# Patient Record
Sex: FEMALE | Race: WHITE | NOT HISPANIC OR LATINO | Employment: OTHER | URBAN - METROPOLITAN AREA
[De-identification: names, ages, dates, MRNs, and addresses within clinical notes are randomized per-mention and may not be internally consistent; named-entity substitution may affect disease eponyms.]

---

## 2024-08-06 ENCOUNTER — OFFICE VISIT (OUTPATIENT)
Dept: OTOLARYNGOLOGY | Facility: CLINIC | Age: 64
End: 2024-08-06
Payer: MEDICARE

## 2024-08-06 VITALS — HEIGHT: 61 IN | TEMPERATURE: 98 F | BODY MASS INDEX: 34.7 KG/M2 | WEIGHT: 183.8 LBS

## 2024-08-06 DIAGNOSIS — H60.392 OTHER INFECTIVE ACUTE OTITIS EXTERNA OF LEFT EAR: ICD-10-CM

## 2024-08-06 DIAGNOSIS — H92.02 LEFT EAR PAIN: Primary | ICD-10-CM

## 2024-08-06 DIAGNOSIS — H61.23 BILATERAL IMPACTED CERUMEN: ICD-10-CM

## 2024-08-06 PROCEDURE — 3008F BODY MASS INDEX DOCD: CPT | Performed by: NURSE PRACTITIONER

## 2024-08-06 PROCEDURE — 99204 OFFICE O/P NEW MOD 45 MIN: CPT | Performed by: NURSE PRACTITIONER

## 2024-08-06 RX ORDER — LAMOTRIGINE 100 MG/1
100 TABLET ORAL
COMMUNITY
Start: 2024-02-01

## 2024-08-06 RX ORDER — LEVOTHYROXINE SODIUM 125 UG/1
1 TABLET ORAL
COMMUNITY
Start: 2017-11-30 | End: 2025-02-26

## 2024-08-06 RX ORDER — ERGOCALCIFEROL 1.25 MG/1
1 CAPSULE ORAL
COMMUNITY
Start: 2024-02-27 | End: 2025-02-28

## 2024-08-06 RX ORDER — EVOLOCUMAB 140 MG/ML
140 INJECTION, SOLUTION SUBCUTANEOUS
COMMUNITY
Start: 2024-02-02

## 2024-08-06 RX ORDER — GABAPENTIN 100 MG/1
CAPSULE ORAL
COMMUNITY
Start: 2024-02-01 | End: 2024-11-29

## 2024-08-06 RX ORDER — NEOMYCIN SULFATE, POLYMYXIN B SULFATE, HYDROCORTISONE 3.5; 10000; 1 MG/ML; [USP'U]/ML; MG/ML
3 SOLUTION/ DROPS AURICULAR (OTIC) 2 TIMES DAILY
Qty: 10 ML | Refills: 0 | Status: SHIPPED | OUTPATIENT
Start: 2024-08-06 | End: 2024-08-13

## 2024-08-06 RX ORDER — CITALOPRAM 10 MG/1
10 TABLET ORAL
COMMUNITY
Start: 2022-10-11

## 2024-08-06 RX ORDER — MAGNESIUM 200 MG
TABLET ORAL
COMMUNITY
Start: 2024-02-27

## 2024-08-06 ASSESSMENT — PATIENT HEALTH QUESTIONNAIRE - PHQ9
1. LITTLE INTEREST OR PLEASURE IN DOING THINGS: NOT AT ALL
2. FEELING DOWN, DEPRESSED OR HOPELESS: NOT AT ALL
SUM OF ALL RESPONSES TO PHQ9 QUESTIONS 1 AND 2: 0

## 2024-08-06 NOTE — PROGRESS NOTES
Subjective   Patient ID: Shruthi Merrill is a 63 y.o. female who presents for left ear pain (With drainage ).  HPI  The patient is referred for evaluation of a left sided ear infection.  When asked about ear pain, hearing loss, discharge from ear, tinnitus, aural fullness or autophony in the affected ear or ears, the patient admits to left-sided otalgia, autophony, fullness and crusting drainage for the past 2 days.  She also reports left-sided throat pain.  When asked about a significant past otological history including history of prior ear surgery, noise exposure, exposure to ototoxic drugs or agents, and/or family history of hearing loss, the patient admits to none.    Review of Systems  A comprehensive or 10 points review of the patient´s constitutional, neurological, HEENT, pulmonary, cardiovascular and genito-urinary systems showed only those mentioned in history of present illness.    Objective   Physical Exam  Constitutional: no fever, chills, weight loss or weight gain   General appearance: Appears well, well-nourished, well groomed. No acute distress.   Communication: Normal communication   Psychiatric: Oriented to person, place and time. Normal mood and affect.   Neurologic: Cranial nerves II-XII grossly intact and symmetric bilaterally.   Head and Face:   Head: Atraumatic with no masses, lesions or scarring.   Face: Normal symmetry, no paralysis, synkinesis or facial tic. No scars or deformities.     Eyes: Conjunctiva not edematous or erythematous   Ears: External inspection of ears with no deformity, scars or masses.  Bilateral canals with cerumen impactions.  Tenderness with tragal pressure.     Neck: Normal appearing, symmetric, trachea midline.   Cardiovascular: Examination of peripheral vascular system shows no clubbing or cyanosis.   Respiratory: No respiratory distress increased work of breathing. Inspection of the chest with symmetric chest expansion and normal respiratory effort.    Skin: No rashes in the head or neck    Assessment/Plan     This patient presents for initial evaluation of acute acquired bilateral cerumen impaction, left-sided otalgia and left otitis externa.    I recommended a 7-day course of antibiotic/steroid drops.  If symptoms have not completely resolved, asked that she contact my office.  Otherwise, she may follow-up as needed.  All questions were answered to patient's satisfaction.    This note was created using speech recognition transcription software. Despite proofreading, several typographical errors might be present that might affect the meaning of the content. Please call with any questions.  Patient ID: Shruthi Merrill is a 63 y.o. female.    Ear cerumen removal    Date/Time: 8/6/2024 11:17 AM    Performed by: JERRY Araiza  Authorized by: JERRY Araiza    Consent:     Consent obtained:  Verbal    Consent given by:  Patient    Risks discussed:  Pain    Alternatives discussed:  No treatment  Procedure details:     Location:  L ear and R ear    Procedure type comment:  Suction and alligator    Procedure outcomes: cerumen removed    Post-procedure details:     Inspection:  No bleeding, ear canal clear and TM intact    Hearing quality:  Improved    Procedure completion:  Tolerated well, no immediate complications  Comments:      On the right, large amount of dried brown cerumen removed.  TM intact.  EAC skin is normal.  On the left, large amount of clear otorrhea, cerumen, wet skin debris removed with suction.  Underlying EAC skin is slightly erythematous.  TM intact.  No effusion or retraction noted.  Berger is midline after cleaning.         JERRY Araiza 08/06/24 11:13 AM

## 2024-08-07 ENCOUNTER — LAB (OUTPATIENT)
Dept: LAB | Facility: LAB | Age: 64
End: 2024-08-07
Payer: MEDICARE

## 2024-08-07 ENCOUNTER — OFFICE VISIT (OUTPATIENT)
Dept: OTOLARYNGOLOGY | Facility: CLINIC | Age: 64
End: 2024-08-07
Payer: MEDICARE

## 2024-08-07 VITALS — BODY MASS INDEX: 34.55 KG/M2 | HEIGHT: 61 IN | WEIGHT: 183 LBS

## 2024-08-07 DIAGNOSIS — H92.02 LEFT EAR PAIN: ICD-10-CM

## 2024-08-07 DIAGNOSIS — H60.392 OTHER INFECTIVE ACUTE OTITIS EXTERNA OF LEFT EAR: ICD-10-CM

## 2024-08-07 DIAGNOSIS — H60.392 OTHER INFECTIVE ACUTE OTITIS EXTERNA OF LEFT EAR: Primary | ICD-10-CM

## 2024-08-07 LAB
CRP SERPL-MCNC: 1.12 MG/DL
ERYTHROCYTE [DISTWIDTH] IN BLOOD BY AUTOMATED COUNT: 12 % (ref 11.5–14.5)
ERYTHROCYTE [SEDIMENTATION RATE] IN BLOOD BY WESTERGREN METHOD: 21 MM/H (ref 0–30)
HCT VFR BLD AUTO: 40.4 % (ref 36–46)
HGB BLD-MCNC: 13.1 G/DL (ref 12–16)
MCH RBC QN AUTO: 31.6 PG (ref 26–34)
MCHC RBC AUTO-ENTMCNC: 32.4 G/DL (ref 32–36)
MCV RBC AUTO: 97 FL (ref 80–100)
NRBC BLD-RTO: 0 /100 WBCS (ref 0–0)
PLATELET # BLD AUTO: 275 X10*3/UL (ref 150–450)
RBC # BLD AUTO: 4.15 X10*6/UL (ref 4–5.2)
WBC # BLD AUTO: 9.2 X10*3/UL (ref 4.4–11.3)

## 2024-08-07 PROCEDURE — 3008F BODY MASS INDEX DOCD: CPT | Performed by: NURSE PRACTITIONER

## 2024-08-07 PROCEDURE — 99214 OFFICE O/P EST MOD 30 MIN: CPT | Performed by: NURSE PRACTITIONER

## 2024-08-07 PROCEDURE — 85652 RBC SED RATE AUTOMATED: CPT

## 2024-08-07 PROCEDURE — 85027 COMPLETE CBC AUTOMATED: CPT

## 2024-08-07 PROCEDURE — 86140 C-REACTIVE PROTEIN: CPT

## 2024-08-07 PROCEDURE — 36415 COLL VENOUS BLD VENIPUNCTURE: CPT

## 2024-08-07 RX ORDER — IBUPROFEN 800 MG/1
800 TABLET ORAL
COMMUNITY

## 2024-08-07 RX ORDER — CEPHALEXIN 500 MG/1
500 CAPSULE ORAL 2 TIMES DAILY
Qty: 28 CAPSULE | Refills: 0 | Status: SHIPPED | OUTPATIENT
Start: 2024-08-07 | End: 2024-08-21

## 2024-08-07 ASSESSMENT — PATIENT HEALTH QUESTIONNAIRE - PHQ9
1. LITTLE INTEREST OR PLEASURE IN DOING THINGS: NOT AT ALL
SUM OF ALL RESPONSES TO PHQ9 QUESTIONS 1 AND 2: 0
2. FEELING DOWN, DEPRESSED OR HOPELESS: NOT AT ALL

## 2024-08-07 NOTE — PROGRESS NOTES
Subjective   Patient ID: Shruthi Merrill is a 63 y.o. female who presents for Follow-up (Fluid in ear and pain in and around ear had blood on cotton ball after removing it.).  HPI  This patient presents for an urgent visit.  In review, she saw me yesterday for left-sided otalgia, cerumen impaction, otitis externa.  She started using Cortisporin drops last night.  She woke this morning with significant worsening of left-sided pain as well as some bloody otorrhea.  She has taken extra strength Tylenol and 800 mg of ibuprofen with minimal relief of the pain.  Review of Systems  A comprehensive or 10 points review of the patient´s constitutional, neurological, HEENT, pulmonary, cardiovascular and genito-urinary systems showed only those mentioned in history of present illness.    Objective   Physical Exam  Constitutional: no fever, chills, weight loss or weight gain   General appearance: Appears well, well-nourished, well groomed. No acute distress.   Communication: Normal communication   Psychiatric: Oriented to person, place and time. Normal mood and affect.   Neurologic: Cranial nerves II-XII grossly intact and symmetric bilaterally.   Head and Face:   Head: Atraumatic with no masses, lesions or scarring.   Face: Normal symmetry, no paralysis, synkinesis or facial tic. No scars or deformities.   TMJ: No left-sided tenderness or crepitus  Eyes: Conjunctiva not edematous or erythematous   Ears: External inspection of ears with no deformity, scars or masses.  Right canal clear.  TM intact.  No effusion or retraction noted.  Left canal now with swelling and slight erythema to the inferior lateral aspect.  No abscess or lesion noted.  Patient is exquisitely tender with exam.  Remaining canal with minimal cerumen debris.  TM intact.  No effusion or retraction noted, but slightly limited visual exam due to swelling of the canal.     Neck: Normal appearing, symmetric, trachea midline.   Cardiovascular:  Examination of peripheral vascular system shows no clubbing or cyanosis.   Respiratory: No respiratory distress increased work of breathing. Inspection of the chest with symmetric chest expansion and normal respiratory effort.   Skin: No rashes in the head or neck    Assessment/Plan     This patient presents for subsequent evaluation of acute acquired left-sided otalgia and left otitis externa.    I reviewed her case/exam with Dr. Valle over the phone.  He recommends adding bacitracin topical twice daily as well as Keflex for possible abscess of the ear canal.  He also recommended checking inflammatory markers and a CBC.  Her symptoms should significantly improve in the next 24 to 48 hours.  I will contact her tomorrow with her lab results.  If pain becomes worse or she develops any erythema behind her left ear, she was advised to go to the main campus ED.  Patient is in agreement with the plan.  All questions were answered to patient's satisfaction.    This note was created using speech recognition transcription software. Despite proofreading, several typographical errors might be present that might affect the meaning of the content. Please call with any questions.         JERRY Araiza 08/07/24 3:24 PM

## 2024-08-13 DIAGNOSIS — H92.02 LEFT EAR PAIN: Primary | ICD-10-CM

## 2024-08-14 ENCOUNTER — HOSPITAL ENCOUNTER (OUTPATIENT)
Dept: RADIOLOGY | Facility: HOSPITAL | Age: 64
Discharge: HOME | End: 2024-08-14
Payer: MEDICARE

## 2024-08-14 DIAGNOSIS — H92.02 LEFT EAR PAIN: ICD-10-CM

## 2024-08-14 PROCEDURE — 70480 CT ORBIT/EAR/FOSSA W/O DYE: CPT

## 2024-08-16 ENCOUNTER — OFFICE VISIT (OUTPATIENT)
Dept: OTOLARYNGOLOGY | Facility: CLINIC | Age: 64
End: 2024-08-16
Payer: MEDICARE

## 2024-08-16 VITALS — WEIGHT: 185 LBS | BODY MASS INDEX: 34.93 KG/M2 | HEIGHT: 61 IN

## 2024-08-16 DIAGNOSIS — H92.02 LEFT EAR PAIN: ICD-10-CM

## 2024-08-16 DIAGNOSIS — H61.892 POLYP OF LEFT EAR CANAL: ICD-10-CM

## 2024-08-16 DIAGNOSIS — H60.392 OTHER INFECTIVE ACUTE OTITIS EXTERNA OF LEFT EAR: ICD-10-CM

## 2024-08-16 DIAGNOSIS — H60.02 ABSCESS OF LEFT EXTERNAL EAR: Primary | ICD-10-CM

## 2024-08-16 PROCEDURE — 87075 CULTR BACTERIA EXCEPT BLOOD: CPT

## 2024-08-16 PROCEDURE — 87102 FUNGUS ISOLATION CULTURE: CPT

## 2024-08-16 PROCEDURE — 87205 SMEAR GRAM STAIN: CPT

## 2024-08-16 PROCEDURE — 3008F BODY MASS INDEX DOCD: CPT | Performed by: OTOLARYNGOLOGY

## 2024-08-16 PROCEDURE — 87077 CULTURE AEROBIC IDENTIFY: CPT

## 2024-08-16 PROCEDURE — 87070 CULTURE OTHR SPECIMN AEROBIC: CPT

## 2024-08-16 PROCEDURE — 99213 OFFICE O/P EST LOW 20 MIN: CPT | Performed by: OTOLARYNGOLOGY

## 2024-08-16 PROCEDURE — 87186 SC STD MICRODIL/AGAR DIL: CPT

## 2024-08-16 PROCEDURE — 69000 DRG XTRNL EAR ABSC/HEM SMPL: CPT | Performed by: OTOLARYNGOLOGY

## 2024-08-16 RX ORDER — CIPROFLOXACIN AND DEXAMETHASONE 3; 1 MG/ML; MG/ML
4 SUSPENSION/ DROPS AURICULAR (OTIC) 2 TIMES DAILY
Qty: 7.5 ML | Refills: 1 | Status: SHIPPED | OUTPATIENT
Start: 2024-08-16 | End: 2024-08-23

## 2024-08-16 ASSESSMENT — PATIENT HEALTH QUESTIONNAIRE - PHQ9
2. FEELING DOWN, DEPRESSED OR HOPELESS: NOT AT ALL
1. LITTLE INTEREST OR PLEASURE IN DOING THINGS: NOT AT ALL
SUM OF ALL RESPONSES TO PHQ9 QUESTIONS 1 AND 2: 0

## 2024-08-16 NOTE — PROGRESS NOTES
Subjective   Patient ID: Shruthi Merrill is a 63 y.o. female who presents for Follow-up (Review CT ).  HPI    This patient presented for an urgent visit last week with a NP.  She was seen last week for left-sided otalgia, cerumen impaction, otitis externa.  She started using Cortisporin drops last night.  She woke that morning with significant worsening of left-sided pain as well as some bloody otorrhea.  She has taken extra strength Tylenol and 800 mg of ibuprofen with minimal relief of the pain.      Objective   Physical Exam  Constitutional: no fever, chills, weight loss or weight gain   General appearance: Appears well, well-nourished, well groomed. No acute distress.   Communication: Normal communication   Psychiatric: Oriented to person, place and time. Normal mood and affect.   Neurologic: Cranial nerves II-XII grossly intact and symmetric bilaterally.   Head and Face:   Head: Atraumatic with no masses, lesions or scarring.   Face: Normal symmetry, no paralysis, synkinesis or facial tic. No scars or deformities.   TMJ: No left-sided tenderness or crepitus  Eyes: Conjunctiva not edematous or erythematous   Ears: External inspection of ears with no deformity, scars or masses.  Right canal clear.  TM intact.  No effusion or retraction noted.  Left canal now with swelling and slight erythema to the inferior lateral aspect with occluding aural polyp that seems to be overlying an ear canal abscess.  There was purulent and keratinaceous debris medial to the polyp.  Cultures were obtained and sent for bacterial and mycotic.  Difficult to fully assess the integrity of the tympanic membrane at this time.  Moderate discomfort during examination and cleaning.  Procedure note: Using suctions and the otomicroscope, the polyp was manipulated and the abscess was drained some purulence was expressed from that area.  Minimal bleeding occurred.  Incision and drainage of L ear abscess was completed followed by  placement of a otowick to allow instillation of the topical antibiotics..      Neck: Normal appearing, symmetric, trachea midline.   Cardiovascular: Examination of peripheral vascular system shows no clubbing or cyanosis.   Respiratory: No respiratory distress increased work of breathing. Inspection of the chest with symmetric chest expansion and normal respiratory effort.   Skin: No rashes in the head or neck    High-resolution temporal bone CT scan recently obtained reviewed showed no erosive findings of the external auditory canal on the left side.  There is thickening of the soft tissue predominantly along the cartilaginous portion of the ear canal.  There does not appear to be any swelling or any increase in the retrocondylar space on that side to suggest necrotizing otitis externa or skull base osteomyelitis.  No opacification of the middle ear or mastoid air cells.    Impression:   Left ear abscess  Left acute otitis externa    Recommendations  Will switch to Ciprodex as the patient informed me that her allergic reaction was mostly dizziness this may be a reaction.  Continue cephalexin and follow-up with her nurse practitioner next week for wick removal and reexamination.    This note was created using speech recognition transcription software. Despite proofreading, several typographical errors might be present that might affect the meaning of the content. Please call with any questions.

## 2024-08-18 LAB
BACTERIA SPEC CULT: ABNORMAL
GRAM STN SPEC: ABNORMAL
GRAM STN SPEC: ABNORMAL

## 2024-08-19 LAB
BACTERIA SPEC CULT: ABNORMAL
BACTERIA SPEC CULT: ABNORMAL
GRAM STN SPEC: ABNORMAL
GRAM STN SPEC: ABNORMAL
LABORATORY COMMENT REPORT: ABNORMAL

## 2024-08-20 LAB
FUNGUS SPEC CULT: NORMAL
FUNGUS SPEC FUNGUS STN: NORMAL

## 2024-08-21 ENCOUNTER — APPOINTMENT (OUTPATIENT)
Dept: OTOLARYNGOLOGY | Facility: CLINIC | Age: 64
End: 2024-08-21
Payer: MEDICARE

## 2024-08-26 LAB
FUNGUS SPEC CULT: NORMAL
FUNGUS SPEC FUNGUS STN: NORMAL

## 2024-08-27 ENCOUNTER — APPOINTMENT (OUTPATIENT)
Dept: OTOLARYNGOLOGY | Facility: CLINIC | Age: 64
End: 2024-08-27
Payer: MEDICARE

## 2024-08-27 VITALS — BODY MASS INDEX: 34.97 KG/M2 | WEIGHT: 185.1 LBS | TEMPERATURE: 97.7 F

## 2024-08-27 DIAGNOSIS — H92.12 OTORRHEA OF LEFT EAR: ICD-10-CM

## 2024-08-27 DIAGNOSIS — H92.02 LEFT EAR PAIN: ICD-10-CM

## 2024-08-27 DIAGNOSIS — H60.02 ABSCESS OF LEFT EXTERNAL EAR: Primary | ICD-10-CM

## 2024-08-27 PROCEDURE — 99213 OFFICE O/P EST LOW 20 MIN: CPT | Performed by: NURSE PRACTITIONER

## 2024-08-27 NOTE — PROGRESS NOTES
Subjective   Patient ID: Shruthi Merrill is a 63 y.o. female who presents for Follow-up.  HPI  This patient presents for a follow-up visit.  In review, she initially saw me for cerumen removal followed by left-sided otalgia.  On initial exam, she had some clear otorrhea and slightly erythematous inferior lateral aspect of the left EAC.  I recommended Ciprodex drops.  Unfortunately, pain worsened.  CTAC showed a left ear canal abscess which was drained by Dr. Valle on 8/16/2024.  Culture positive for Pseudomonas which is susceptible to Cipro.  A wick was also placed during that visit.  Patient has been on Keflex and Ciprodex drops.  Today, she reports some yellow/green otorrhea, left aural fullness, left hearing difficulty, and occasional sharp pains in the left ear canal.  Pain is overall improved.      Review of Systems  A comprehensive or 10 points review of the patient's constitutional, neurological, HEENT, pulmonary, cardiovascular and genito-urinary systems showed only those mentioned in history of present illness.    Objective   Physical Exam  Constitutional: no fever, chills, weight loss or weight gain   General appearance: Appears well, well-nourished, well groomed. No acute distress.   Communication: Normal communication   Psychiatric: Oriented to person, place and time. Normal mood and affect.   Neurologic: Cranial nerves II-XII grossly intact and symmetric bilaterally.   Head and Face:   Head: Atraumatic with no masses, lesions or scarring.   Face: Normal symmetry, no paralysis, synkinesis or facial tic. No scars or deformities.     Eyes: Conjunctiva not edematous or erythematous   Ears: External inspection of ears with no deformity, scars or masses.  Right canal clear.  TM intact.  No effusion or retraction noted.  Left canal occluded with partially extruded wick.     Neck: Normal appearing, symmetric, trachea midline.   Cardiovascular: Examination of peripheral vascular system shows no  clubbing or cyanosis.   Respiratory: No respiratory distress increased work of breathing. Inspection of the chest with symmetric chest expansion and normal respiratory effort.   Skin: No rashes in the head or neck    Assessment/Plan          This patient presents for subsequent evaluation of acute acquired left ear canal abscess, left-sided otalgia, left otorrhea.    We discussed that there is still significant inflammatory tissue and some drainage from the abscess.  I recommended she continue using Ciprodex drops and follow-up with myself or Dr. Valle in 1 week.  Plan was reviewed with Dr. Valle. Patient is in agreement with the plan.  All questions were answered to patient's satisfaction.    This note was created using speech recognition transcription software. Despite proofreading, several typographical errors might be present that might affect the meaning of the content. Please call with any questions.  Patient ID: Shruthi Merrill is a 63 y.o. female.    Foreign body removal    Date/Time: 8/27/2024 10:42 AM    Performed by: JERRY Araiza  Authorized by: JERRY Araiza    Consent:     Consent obtained:  Verbal    Consent given by:  Patient    Risks discussed:  Pain  Comments:      Using the microscope and alligator, wick removed from left ear canal without difficulty.  Moderate yellow debris removed with a #5 suction.  TM intact.  No effusion or retraction noted.  There is still a large area of inflamed/polypoid tissue today left inferior/posterior lateral aspect of the EAC.  Using a small swab, moderate amount of thick purulent debris expressed followed by small amount of bleeding.    After wick removal, patient reports that fullness and hearing difficulties have resolved.        JERRY Araiza 08/27/24 10:37 AM

## 2024-09-03 LAB
FUNGUS SPEC CULT: NORMAL
FUNGUS SPEC FUNGUS STN: NORMAL

## 2024-09-05 ENCOUNTER — APPOINTMENT (OUTPATIENT)
Dept: OTOLARYNGOLOGY | Facility: CLINIC | Age: 64
End: 2024-09-05
Payer: MEDICARE

## 2024-09-05 VITALS — TEMPERATURE: 97.8 F | HEIGHT: 61 IN | BODY MASS INDEX: 35.36 KG/M2 | WEIGHT: 187.3 LBS

## 2024-09-05 DIAGNOSIS — H60.02 ABSCESS OF LEFT EXTERNAL EAR: Primary | ICD-10-CM

## 2024-09-05 PROCEDURE — 3008F BODY MASS INDEX DOCD: CPT | Performed by: NURSE PRACTITIONER

## 2024-09-05 PROCEDURE — 99213 OFFICE O/P EST LOW 20 MIN: CPT | Performed by: NURSE PRACTITIONER

## 2024-09-05 ASSESSMENT — PATIENT HEALTH QUESTIONNAIRE - PHQ9
SUM OF ALL RESPONSES TO PHQ9 QUESTIONS 1 AND 2: 0
2. FEELING DOWN, DEPRESSED OR HOPELESS: NOT AT ALL
1. LITTLE INTEREST OR PLEASURE IN DOING THINGS: NOT AT ALL

## 2024-09-05 NOTE — PROGRESS NOTES
Subjective   Patient ID: Shruthi Merrill is a 63 y.o. female who presents for Follow-up (Left ear ).  HPI  This patient presents for weekly surveillance of left ear canal abscess.  Patient was just discharged from the hospital this morning after having a pacemaker inserted.  Today, she denies any otalgia for several days and no otorrhea x 2 days.  She we will be using Ciprodex drops for 2 more days.  She is following up with Dr. Valle next week.  Review of Systems  A comprehensive or 10 points review of the patient's constitutional, neurological, HEENT, pulmonary, cardiovascular and genito-urinary systems showed only those mentioned in history of present illness.    Objective   Physical Exam  Constitutional: no fever, chills, weight loss or weight gain   General appearance: Appears well, well-nourished, well groomed. No acute distress.   Communication: Normal communication   Psychiatric: Oriented to person, place and time. Normal mood and affect.   Neurologic: Cranial nerves II-XII grossly intact and symmetric bilaterally.   Head and Face:   Head: Atraumatic with no masses, lesions or scarring.   Face: Normal symmetry, no paralysis, synkinesis or facial tic. No scars or deformities.     Eyes: Conjunctiva not edematous or erythematous   Ears: External inspection of ears with no deformity, scars or masses.  Left canal with minimal nonocclusive cerumen adherent to the inferior posterior aspect which was easily removed with the microscope and loop.  Underlying skin is still swollen and on even, but inflammation has resolved and there is no active purulent drainage.     Neck: Normal appearing, symmetric, trachea midline.   Cardiovascular: Examination of peripheral vascular system shows no clubbing or cyanosis.   Respiratory: No respiratory distress increased work of breathing. Inspection of the chest with symmetric chest expansion and normal respiratory effort.   Skin: No rashes in the head or  neck    Assessment/Plan        This patient presents for subsequent evaluation of acute acquired left ear canal abscess.      Reassurance given that her exam is improving, but has not returned to baseline.  The skin of her ear canal around the abscess is still uneven and swollen.  I recommended that she keep her follow-up with . She may follow-up with me as needed.  All questions were answered to patient's satisfaction.Tori.     This note was created using speech recognition transcription software. Despite proofreading, several typographical errors might be present that might affect the meaning of the content. Please call with any questions.      JUD Araiza-CNP 09/05/24 4:41 PM

## 2024-09-12 ENCOUNTER — APPOINTMENT (OUTPATIENT)
Dept: OTOLARYNGOLOGY | Facility: CLINIC | Age: 64
End: 2024-09-12
Payer: MEDICARE

## 2024-09-12 VITALS — BODY MASS INDEX: 35.3 KG/M2 | WEIGHT: 187 LBS | HEIGHT: 61 IN | TEMPERATURE: 97.5 F

## 2024-09-12 DIAGNOSIS — H60.02 ABSCESS OF LEFT EXTERNAL EAR: Primary | ICD-10-CM

## 2024-09-12 PROCEDURE — 99213 OFFICE O/P EST LOW 20 MIN: CPT | Performed by: OTOLARYNGOLOGY

## 2024-09-12 PROCEDURE — 3008F BODY MASS INDEX DOCD: CPT | Performed by: OTOLARYNGOLOGY

## 2024-09-12 NOTE — PROGRESS NOTES
History of present illness:  Shruthi Merrill is a 63 y.o. female who presents for severe acute left otitis externa and left ear mass.  From a symptomatic standpoint her left ear feels much better.  She completed a course of oral and topical antibiotics.  She was able to travel with her grandkids without any significant issues.    RECALL 8/16/24:   This patient presented for an urgent visit last week with a NP.  She was seen last week for left-sided otalgia, cerumen impaction, otitis externa.  She started using Cortisporin drops last night.  She woke that morning with significant worsening of left-sided pain as well as some bloody otorrhea.  She has taken extra strength Tylenol and 800 mg of ibuprofen with minimal relief of the pain.     The patient's current medications, active allergies and list of medical problems were reviewed in the EHR and confirmed electronically there are as follows;  Allergies:   Allergies   Allergen Reactions    Gluten Protein Diarrhea, GI Upset and Other     Celiac Disease     Celiac Disease    Hydrocodone-Acetaminophen Other and Unknown    Propoxyphene N-Acetaminophen Hallucinations and Unknown     hallucinating    Topiramate Drowsiness, Unknown and Rash     dizziness    rash    Levofloxacin Dizziness and Hives     dizziness, lightheadedness    Phenytoin Rash    Casein Hives, Itching and Unknown     Tested pos.    Tested pos.   Tested pos.    Egg Itching, Other and Hives     Tested pos.    Tested pos.   Tested pos.    Hydrocodone Unknown and Hives    Hydrocodone-Ibuprofen Hallucinations    Propoxyphene Hallucinations    Statins-Hmg-Coa Reductase Inhibitors Agitation    Methotrexate Rash     Current list of medications:   Current Outpatient Medications   Medication Sig Dispense Refill    citalopram (CeleXA) 10 mg tablet Take 1 tablet (10 mg) by mouth once daily.      cyanocobalamin, vitamin B-12, 1,000 mcg tablet, sublingual TAKE 1 TABLET BY SUBLINGUAL ROUTE DAILY.       ergocalciferol (Vitamin D-2) 1.25 MG (80370 UT) capsule Take 1 capsule (1,250 mcg) by mouth 1 (one) time per week.      gabapentin (Neurontin) 100 mg capsule Take two capsules by mouth in the morning, one capsule midday, and one capsule in the evening.      ibuprofen 800 mg tablet Take 1 tablet (800 mg) by mouth.      lamoTRIgine (LaMICtal) 100 mg tablet Take 1 tablet (100 mg) by mouth once daily.      levothyroxine (Synthroid, Levoxyl) 125 mcg tablet Take 1 tablet (125 mcg) by mouth once daily in the morning. Take before meals.      Repatha SureClick 140 mg/mL injection Inject 1 mL (140 mg) under the skin.       No current facility-administered medications for this visit.     Problem list:  There is no problem list on file for this patient.        Physical Examination:  CONSTITUTIONAL:  No acute distress  VOICE:  No hoarseness or other abnormality  RESPIRATION:  Breathing comfortably, no stridor  CV:  No clubbing/cyanosis/edema in hands  EYES:  EOM intact, sclera clear  NEURO:  Alert and oriented times 3, Cranial nerves II-XII grossly intact and symmetric bilaterally  HEAD AND FACE:  Symmetric facial features, no masses or lesions  RIGHT EAR:  Normal external ear and post auricular area, no visible lesions, external auditory canal patent, tympanic membrane intact, no retraction, no signs of mass, effusion, or infection within the middle ear  LEFT EAR: Improved finding in the ear, with exception of a small granulation tissue 1 by 1 mm.  Normal external ear and post auricular area, no visible lesions, external auditory canal patent, tympanic membrane intact, no retraction, no signs of mass, effusion, or infection within the middle ear  NOSE:  Anterior rhinoscopy clear, no significant external deformity.  ORAL CAVITY/OROPHARYNX/LIPS:  normal lining, mobile tongue.  PHARYNGEAL WALLS: Moist mucosal lining, good palatal elevation  NECK/LYMPH:  No LAD, no thyroid masses, trachea midline  SKIN:  Neck and facial skin is  without scar or injury  PSYCH:  Alert and oriented with appropriate mood and affect    Impression:  Left ear canal mass, likely granulation tissue.   Left severe acute otitis externa    Recommendation:  At this point, her exam is much improved from when I last saw her. She still has a small granulation tissue at the base of the ear canal. I applied bacitracin on exam today and suggested using that for 5 more days. If persistent after few weeks we will need a biopsy.     I discussed with the patient the complexity of my medical decision making including the treatment and testing rational, indications of their elective procedure and possible adverse effects and/or complications. Based on the provided documentation and my professional assessment of this patient's chronic condition, the complexity of evaluation and treatment is moderate.    This note was created using speech recognition transcription software. Despite proofreading, several typographical errors might be present that might affect the meaning of the content. Please call with any questions.      Scribe Attestation  By signing my name below, I Francinesummer Bailey , Scribe attest that this documentation has been prepared under the direction and in the presence of Baldemar Valle MD.

## 2025-01-09 ENCOUNTER — APPOINTMENT (OUTPATIENT)
Dept: OTOLARYNGOLOGY | Facility: CLINIC | Age: 65
End: 2025-01-09
Payer: MEDICARE

## 2025-03-07 ENCOUNTER — OFFICE VISIT (OUTPATIENT)
Dept: PULMONOLOGY | Facility: HOSPITAL | Age: 65
End: 2025-03-07
Payer: MEDICARE

## 2025-03-07 VITALS
HEART RATE: 76 BPM | RESPIRATION RATE: 16 BRPM | HEIGHT: 61 IN | WEIGHT: 186 LBS | BODY MASS INDEX: 35.12 KG/M2 | OXYGEN SATURATION: 97 % | TEMPERATURE: 97 F | SYSTOLIC BLOOD PRESSURE: 136 MMHG | DIASTOLIC BLOOD PRESSURE: 84 MMHG

## 2025-03-07 DIAGNOSIS — J44.9 CHRONIC OBSTRUCTIVE PULMONARY DISEASE, UNSPECIFIED COPD TYPE (MULTI): Primary | ICD-10-CM

## 2025-03-07 DIAGNOSIS — J30.9 ALLERGIC RHINITIS, UNSPECIFIED SEASONALITY, UNSPECIFIED TRIGGER: ICD-10-CM

## 2025-03-07 PROCEDURE — 99204 OFFICE O/P NEW MOD 45 MIN: CPT | Performed by: NURSE PRACTITIONER

## 2025-03-07 PROCEDURE — 3008F BODY MASS INDEX DOCD: CPT | Performed by: NURSE PRACTITIONER

## 2025-03-07 PROCEDURE — 99214 OFFICE O/P EST MOD 30 MIN: CPT | Performed by: NURSE PRACTITIONER

## 2025-03-07 RX ORDER — ALBUTEROL SULFATE 90 UG/1
4 INHALANT RESPIRATORY (INHALATION) ONCE
OUTPATIENT
Start: 2025-03-07

## 2025-03-07 RX ORDER — AZELASTINE 1 MG/ML
1 SPRAY, METERED NASAL 2 TIMES DAILY
Qty: 1 ML | Refills: 11 | Status: SHIPPED | OUTPATIENT
Start: 2025-03-07

## 2025-03-07 RX ORDER — ALBUTEROL SULFATE 0.83 MG/ML
3 SOLUTION RESPIRATORY (INHALATION) ONCE
OUTPATIENT
Start: 2025-03-07 | End: 2025-03-07

## 2025-03-07 ASSESSMENT — ENCOUNTER SYMPTOMS
COUGH: 1
FEVER: 0
SHORTNESS OF BREATH: 1
FATIGUE: 0
RHINORRHEA: 0
CHILLS: 0
UNEXPECTED WEIGHT CHANGE: 0
WHEEZING: 1

## 2025-03-07 NOTE — PATIENT INSTRUCTIONS
I will start on Anoro one puff once a day, everyday. I placed a pharmacy referral to see if we can help with cost.  Start on albuterol as needed for shortness of breath.   Start Azelastine nasal spray 1-2 times per day for sinus drainage.   Please get lung test done.   Please get blood work done when you have it drawn next.   Call with any questions or concerns.  Follow up with me in 3 months.

## 2025-03-07 NOTE — PROGRESS NOTES
Subjective   Patient ID: Shruthi Merrill is a 64 y.o. female who presents for [facility] for COPD.     HPI: Patient has PMH of COPD, nicotine dependence, CAD, AV block s/p pacer, HLD, and RICH. She was referred for COPD management. She states that she has shortness of breath on exertion and a daily productive cough mostly in the morning. She has never been on any inhalers in the past. LDCT with emphysema. She does get sinus drainage all throughout the year. She is not currently taking anything for this. She will hear herself wheezing on occasion. She is going to establish with new PCP through . She is a current smoker at 1/2 ppd and has smoked x 50 years. She has RICH and no longer wears CPAP but uses a dental appliance. She denies any history of asthma, one of her sisters have asthma. She denies prior occupational exposures.     Review of Systems   Constitutional:  Negative for chills, fatigue, fever and unexpected weight change.   HENT:  Positive for postnasal drip. Negative for congestion and rhinorrhea.    Respiratory:  Positive for cough (denies hemoptysis.), shortness of breath and wheezing.    Cardiovascular:  Negative for chest pain and leg swelling.   All other systems reviewed and are negative.      Objective   Physical Exam  Vitals reviewed.   Constitutional:       Appearance: Normal appearance.   HENT:      Head: Normocephalic.   Cardiovascular:      Rate and Rhythm: Normal rate and regular rhythm.   Pulmonary:      Effort: Pulmonary effort is normal.      Breath sounds: Decreased breath sounds present.   Skin:     General: Skin is warm and dry.   Neurological:      Mental Status: She is alert.         Assessment/Plan   COPD  Nicotine dependence   Allergic rhinitis   RICH    Plan:    -PFTs ordered.  -Start on Anoro one puff once a day. Pharmacy referral placed I see her  as well and ordered this it was over $120 and they cannot afford this monthly. I will place a pharmacy referral for her  today.  -Start albuterol prn.   -AAT ordered.  -She is a current smoker at 1/2 ppd and has smoked x 50 years.   -LDCT done December 2024 with a stable 3 mm nodule and emphysematous changes. She will qualify for annual LDCT December 2025, will order on follow up.  -IGE/RAST ordered.  -Start on Azelastine nasal spray BID.  -She has RICH and states she no longer needs CPAP but wears dental appliance. She states symptoms are controlled.    Overall I will optimize COPD management and obtain testing. I will see her back in 3 months. I instructed patient to call sooner if needed.      Total time:  45 min.

## 2025-03-19 ENCOUNTER — HOSPITAL ENCOUNTER (OUTPATIENT)
Dept: RESPIRATORY THERAPY | Facility: HOSPITAL | Age: 65
Discharge: HOME | End: 2025-03-19
Payer: MEDICARE

## 2025-03-19 DIAGNOSIS — J44.9 CHRONIC OBSTRUCTIVE PULMONARY DISEASE, UNSPECIFIED COPD TYPE (MULTI): ICD-10-CM

## 2025-03-19 LAB
MGC ASCENT PFT - FEV1 - POST: 1.98
MGC ASCENT PFT - FEV1 - PRE: 1.92
MGC ASCENT PFT - FEV1 - PREDICTED: 2.03
MGC ASCENT PFT - FVC - POST: 2.78
MGC ASCENT PFT - FVC - PRE: 2.65
MGC ASCENT PFT - FVC - PREDICTED: 2.54

## 2025-03-19 PROCEDURE — 94726 PLETHYSMOGRAPHY LUNG VOLUMES: CPT

## 2025-03-19 PROCEDURE — 2500000001 HC RX 250 WO HCPCS SELF ADMINISTERED DRUGS (ALT 637 FOR MEDICARE OP): Performed by: NURSE PRACTITIONER

## 2025-03-19 PROCEDURE — 94640 AIRWAY INHALATION TREATMENT: CPT

## 2025-03-19 RX ORDER — ALBUTEROL SULFATE 0.83 MG/ML
3 SOLUTION RESPIRATORY (INHALATION) ONCE
Status: COMPLETED | OUTPATIENT
Start: 2025-03-19 | End: 2025-03-19

## 2025-03-19 RX ORDER — ALBUTEROL SULFATE 90 UG/1
4 INHALANT RESPIRATORY (INHALATION) ONCE
Status: COMPLETED | OUTPATIENT
Start: 2025-03-19 | End: 2025-03-19

## 2025-03-19 RX ADMIN — ALBUTEROL SULFATE 4 PUFF: 90 AEROSOL, METERED RESPIRATORY (INHALATION) at 09:56

## 2025-03-21 ENCOUNTER — TELEPHONE (OUTPATIENT)
Dept: PULMONOLOGY | Facility: HOSPITAL | Age: 65
End: 2025-03-21

## 2025-03-21 ENCOUNTER — APPOINTMENT (OUTPATIENT)
Dept: PHARMACY | Facility: HOSPITAL | Age: 65
End: 2025-03-21
Payer: MEDICARE

## 2025-03-21 DIAGNOSIS — J44.9 CHRONIC OBSTRUCTIVE PULMONARY DISEASE, UNSPECIFIED COPD TYPE (MULTI): ICD-10-CM

## 2025-03-21 RX ORDER — ASPIRIN 81 MG/1
81 TABLET ORAL DAILY
COMMUNITY

## 2025-03-21 RX ORDER — LIRAGLUTIDE 6 MG/ML
2.4 INJECTION SUBCUTANEOUS DAILY
COMMUNITY

## 2025-03-21 RX ORDER — ERGOCALCIFEROL 1.25 MG/1
1.25 CAPSULE ORAL WEEKLY
COMMUNITY

## 2025-03-21 RX ORDER — UMECLIDINIUM BROMIDE AND VILANTEROL TRIFENATATE 62.5; 25 UG/1; UG/1
1 POWDER RESPIRATORY (INHALATION) DAILY
Qty: 60 EACH | Refills: 11 | Status: SHIPPED | OUTPATIENT
Start: 2025-03-21

## 2025-03-21 NOTE — PROGRESS NOTES
"  Pharmacist Clinic: Pulmonary Management    Shruthi Merrill is a 64 y.o. female was referred to Clinical Pharmacy Team for Pulmonary assessment.   Referring Provider: Cecile Zapata APRN-C*  Last visit: 3/7/25  Next visit: 5/30/25    Subjective   Allergies   Allergen Reactions    Gluten Protein Diarrhea, GI Upset and Other     Celiac Disease     Celiac Disease    Hydrocodone-Acetaminophen Other and Unknown    Propoxyphene N-Acetaminophen Hallucinations and Unknown     hallucinating    Topiramate Drowsiness, Unknown and Rash     dizziness    rash    Levofloxacin Dizziness and Hives     dizziness, lightheadedness    Phenytoin Rash    Casein Hives, Itching and Unknown     Tested pos.    Tested pos.   Tested pos.    Egg Itching, Other and Hives     Tested pos.    Tested pos.   Tested pos.    Hydrocodone Unknown and Hives    Hydrocodone-Ibuprofen Hallucinations    Propoxyphene Hallucinations    Statins-Hmg-Coa Reductase Inhibitors Agitation    Methotrexate Rash       HPI    PULMONARY ASSESSMENT  Patient has been diagnosed with: COPD    Current Regimen:  Albuterol HFA    Historical Treatment:  Anoro (stopped due to cost)    Symptom Management:  Current symptoms: dyspnea  Triggers: exertion  Alleviating factors: rescue treatment    Exacerbation Hx:  When was your last hospitalization for an exacerbation? N/a  When was the last time you were treated with antibiotics and/or steroids? N/a     Rescue Inhaler Use:  How often do you use your rescue inhaler? < 2x weekly at rest, daily with exertion    Appropriate Inhaler Technique: yes    Smoking history  - She currently smokes  0.5  packs per day.      Objective   There were no vitals taken for this visit.    Secondary Prevention (vaccines):  -Influenza: Date [Recommended]  -PCV20: Date [2025]  -COVID: Date [2021]  -RSV: Date [Recommended]  -TDAP: Date [Recommended]  -Shingrix: Date [2020]    Pulmonary Functions Testing Results:  No results found for: \"FEV1\", " "\"FVC\", \"TMH7UKN\", \"TLC\", \"DLCO\"    Lab Review  No results found for: \"BILITOT\", \"CALCIUM\", \"CO2\", \"CL\", \"CREATININE\", \"GLUCOSE\", \"ALKPHOS\", \"K\", \"PROT\", \"NA\", \"AST\", \"ALT\", \"BUN\", \"ANIONGAP\", \"MG\", \"PHOS\", \"GGT\", \"LDH\", \"ALBUMIN\", \"AMYLASE\", \"LIPASE\", \"GFRF\", \"GFRMALE\"  Hemoglobin   Date Value Ref Range Status   08/07/2024 13.1 12.0 - 16.0 g/dL Final     WBC   Date Value Ref Range Status   08/07/2024 9.2 4.4 - 11.3 x10*3/uL Final     Platelets   Date Value Ref Range Status   08/07/2024 275 150 - 450 x10*3/uL Final       The ASCVD Risk score (Kayleen DK, et al., 2019) failed to calculate for the following reasons:    Cannot find a previous HDL lab    Cannot find a previous total cholesterol lab    Current Outpatient Medications   Medication Instructions    aspirin 81 mg, Daily    azelastine (Astelin) 137 mcg (0.1 %) nasal spray 1 spray, Each Nostril, 2 times daily, Use in each nostril as directed    citalopram (CELEXA) 10 mg, Daily RT    cyanocobalamin, vitamin B-12, 1,000 mcg tablet, sublingual TAKE 1 TABLET BY SUBLINGUAL ROUTE DAILY.    ergocalciferol (VITAMIN D-2) 1.25 mg, Weekly    gabapentin (Neurontin) 100 mg capsule Take two capsules by mouth in the morning, one capsule midday, and one capsule in the evening.    ibuprofen 800 mg    lamoTRIgine (LAMICTAL) 100 mg, Daily RT    levothyroxine (Synthroid, Levoxyl) 125 mcg tablet 1 tablet, Daily before breakfast    liraglutide (VICTOZA) 2.4 mg, Daily    Repatha SureClick 140 mg    umeclidinium-vilanteroL (Anoro Ellipta) 62.5-25 mcg/actuation blister with device 1 puff, inhalation, Daily       Drug Interactions:  None requiring intervention, though GLP1 dose does need to be clarified with PCP    Affordability/Accessibility:  Struggles to afford inhaler cost due to fixed income    Assessment/Plan   Problem List Items Addressed This Visit    None  Visit Diagnoses       Chronic obstructive pulmonary disease, unspecified COPD type (Multi)        Relevant Medications    " umeclidinium-vilanteroL (Anoro Ellipta) 62.5-25 mcg/actuation blister with device    Other Relevant Orders    Referral to Clinical Pharmacy            ASSESSMENT:  COPD complicated by continued tobacco use and limited exercise capacity that would benefit from maintenance therapy    PLAN:  Start Anoro daily  Smoking cessation: reports about 2-3 cigarettes per day; has been reducing over time, consider gum or lozenge, 2 mg to treat cravings  Patient plans to send financial information by email  Follow up 4/18/25 @ 4656     Patient Assistance Screening (VAF)    Patient verbally reports monthly or yearly income which is less than 400% federal poverty level     Application for program has been submitted for the following medications: albuterol HFA, Anoro    Patient has been informed that program team will be reaching out to them to discuss necessary documentation, instructed to answer phone/return voicemail.     Patient aware this process may take up to 6 weeks.     If approved medication must be filled through [facility] pharmacy and may be picked up or mailed to patient.       Varinder Kemp RPh    Continue all meds under the continuation of care with the referring provider and clinical pharmacy team.    Verbal consent to manage patient's drug therapy was obtained from the patient. They were informed they may decline to participate or withdraw from participation in pharmacy services at any time.

## 2025-03-21 NOTE — TELEPHONE ENCOUNTER
Called and spoke with patient regarding PFT results. Advised she continue working with the pharmacy program to get the Anoro based on her symptoms. Instructed her to call us back with any further questions or concerns. Patient was agreeable to treatment plan and acknowledged understanding.     ----- Message from Cecile Zapata sent at 3/20/2025  9:25 AM EDT -----  Please call the patient and let her know that her PFT was normal which is great, recommend due to symptoms to still start on Anoro once she gets this through the pharmacy program.

## 2025-03-28 PROCEDURE — RXMED WILLOW AMBULATORY MEDICATION CHARGE

## 2025-04-01 ENCOUNTER — APPOINTMENT (OUTPATIENT)
Dept: PULMONOLOGY | Facility: HOSPITAL | Age: 65
End: 2025-04-01
Payer: MEDICARE

## 2025-04-01 ENCOUNTER — PHARMACY VISIT (OUTPATIENT)
Dept: PHARMACY | Facility: CLINIC | Age: 65
End: 2025-04-01
Payer: MEDICARE

## 2025-04-18 ENCOUNTER — APPOINTMENT (OUTPATIENT)
Dept: PHARMACY | Facility: HOSPITAL | Age: 65
End: 2025-04-18
Payer: MEDICARE

## 2025-04-18 DIAGNOSIS — J44.9 CHRONIC OBSTRUCTIVE PULMONARY DISEASE, UNSPECIFIED COPD TYPE (MULTI): ICD-10-CM

## 2025-04-18 PROCEDURE — RXMED WILLOW AMBULATORY MEDICATION CHARGE

## 2025-04-18 RX ORDER — ALBUTEROL SULFATE 90 UG/1
2 INHALANT RESPIRATORY (INHALATION) EVERY 6 HOURS PRN
Qty: 18 G | Refills: 11 | Status: SHIPPED | OUTPATIENT
Start: 2025-04-18 | End: 2026-04-18

## 2025-04-18 NOTE — PROGRESS NOTES
"  Pharmacist Clinic: Pulmonary Management    Shruthi Merrill is a 64 y.o. female was referred to Clinical Pharmacy Team for Pulmonary assessment.   Referring Provider: Cecile Zapata APRN-C*  Last visit: 3/7/25  Next visit: 5/30/25    Subjective   Allergies   Allergen Reactions    Gluten Protein Diarrhea, GI Upset and Other     Celiac Disease     Celiac Disease    Hydrocodone-Acetaminophen Other and Unknown    Propoxyphene N-Acetaminophen Hallucinations and Unknown     hallucinating    Topiramate Drowsiness, Unknown and Rash     dizziness    rash    Levofloxacin Dizziness and Hives     dizziness, lightheadedness    Phenytoin Rash    Casein Hives, Itching and Unknown     Tested pos.    Tested pos.   Tested pos.    Egg Itching, Other and Hives     Tested pos.    Tested pos.   Tested pos.    Hydrocodone Unknown and Hives    Hydrocodone-Ibuprofen Hallucinations    Propoxyphene Hallucinations    Statins-Hmg-Coa Reductase Inhibitors Agitation    Methotrexate Rash       HPI    PULMONARY ASSESSMENT  Patient has been diagnosed with: COPD    Current Regimen:  Albuterol HFA    Historical Treatment:  Anoro (stopped due to cost)    Symptom Management:  Current symptoms: dyspnea  Triggers: exertion  Alleviating factors: rescue treatment    Exacerbation Hx:  When was your last hospitalization for an exacerbation? N/a  When was the last time you were treated with antibiotics and/or steroids? N/a     Rescue Inhaler Use:  How often do you use your rescue inhaler? < 2x weekly at rest, daily with exertion    Appropriate Inhaler Technique: yes    Smoking history  - She currently smokes  0.5  packs per day.      Objective   There were no vitals taken for this visit.    Secondary Prevention (vaccines):  -Influenza: Date [Recommended]  -PCV20: Date [2025]  -COVID: Date [2021]  -RSV: Date [Recommended]  -TDAP: Date [Recommended]  -Shingrix: Date [2020]    Pulmonary Functions Testing Results:  No results found for: \"FEV1\", " "\"FVC\", \"YMK8CHD\", \"TLC\", \"DLCO\"    Lab Review  No results found for: \"BILITOT\", \"CALCIUM\", \"CO2\", \"CL\", \"CREATININE\", \"GLUCOSE\", \"ALKPHOS\", \"K\", \"PROT\", \"NA\", \"AST\", \"ALT\", \"BUN\", \"ANIONGAP\", \"MG\", \"PHOS\", \"GGT\", \"LDH\", \"ALBUMIN\", \"AMYLASE\", \"LIPASE\", \"GFRF\", \"GFRMALE\"  Hemoglobin   Date Value Ref Range Status   08/07/2024 13.1 12.0 - 16.0 g/dL Final     WBC   Date Value Ref Range Status   08/07/2024 9.2 4.4 - 11.3 x10*3/uL Final     Platelets   Date Value Ref Range Status   08/07/2024 275 150 - 450 x10*3/uL Final       The ASCVD Risk score (Kayleen DK, et al., 2019) failed to calculate for the following reasons:    Cannot find a previous HDL lab    Cannot find a previous total cholesterol lab    Current Outpatient Medications   Medication Instructions    aspirin 81 mg, Daily    azelastine (Astelin) 137 mcg (0.1 %) nasal spray 1 spray, Each Nostril, 2 times daily, Use in each nostril as directed    citalopram (CELEXA) 10 mg, Daily RT    cyanocobalamin, vitamin B-12, 1,000 mcg tablet, sublingual TAKE 1 TABLET BY SUBLINGUAL ROUTE DAILY.    ergocalciferol (VITAMIN D-2) 1.25 mg, Weekly    gabapentin (Neurontin) 100 mg capsule Take two capsules by mouth in the morning, one capsule midday, and one capsule in the evening.    ibuprofen 800 mg    lamoTRIgine (LAMICTAL) 100 mg, Daily RT    levothyroxine (Synthroid, Levoxyl) 125 mcg tablet 1 tablet, Daily before breakfast    liraglutide (VICTOZA) 2.4 mg, Daily    Repatha SureClick 140 mg    umeclidinium-vilanteroL (Anoro Ellipta) 62.5-25 mcg/actuation blister with inhaler device 1 puff, inhalation, Daily       Drug Interactions:  None requiring intervention, though GLP1 dose does need to be clarified with PCP    Affordability/Accessibility:  Struggles to afford inhaler cost due to fixed income    Assessment/Plan   Problem List Items Addressed This Visit    None  Visit Diagnoses         Chronic obstructive pulmonary disease, unspecified COPD type (Multi)          "     ASSESSMENT:  COPD complicated by continued tobacco use and limited exercise capacity that would benefit from maintenance therapy    PLAN:  Continue Anoro daily, ordered albuterol HFA for PRN use   Counseled on administration and side effect mgmt  Smoking cessation: reports about 2-3 full cigarettes per day; has been reducing over time, consider gum or lozenge, 2 mg to treat cravings   She has not tolerated bupropion, nicotine patches or gum in the past due to side effects   Recommend varenicline as this has been effective for her in the past, may discuss with prescriber of escitalopram and lamotrigine  Follow up PRN for continued enrollment     Patient Assistance Program Approval:     We are pleased to inform you that your application for assistance has been approved.     This approval is valid through  3/28/26  as long as the following criteria continue to be satisfied:     Your medication (Anoro) remains covered under your current insurance plan.   Your prescriber does not discontinue therapy.   You do not seek reimbursement from any other private or government-funded programs for the  medication.    Under this program, the pharmacy will first bill your insurance plan for your indemnified specified medication. The [employer] Assistance Fund will then offset your copay balance, so that your out-of pocket expense for your specialty medication will be $0.00.    Varinder Kemp RPh    Continue all meds under the continuation of care with the referring provider and clinical pharmacy team.    Verbal consent to manage patient's drug therapy was obtained from the patient. They were informed they may decline to participate or withdraw from participation in pharmacy services at any time.

## 2025-04-19 ENCOUNTER — PHARMACY VISIT (OUTPATIENT)
Dept: PHARMACY | Facility: CLINIC | Age: 65
End: 2025-04-19
Payer: MEDICARE

## 2025-05-13 PROCEDURE — RXMED WILLOW AMBULATORY MEDICATION CHARGE

## 2025-05-14 PROCEDURE — RXMED WILLOW AMBULATORY MEDICATION CHARGE

## 2025-05-15 ENCOUNTER — TELEPHONE (OUTPATIENT)
Dept: PULMONOLOGY | Facility: HOSPITAL | Age: 65
End: 2025-05-15
Payer: MEDICARE

## 2025-05-15 ENCOUNTER — PHARMACY VISIT (OUTPATIENT)
Dept: PHARMACY | Facility: CLINIC | Age: 65
End: 2025-05-15
Payer: MEDICARE

## 2025-05-15 NOTE — TELEPHONE ENCOUNTER
Patients  acknowledged understanding. All questions answered at this time.     ----- Message from Cecile Zapata sent at 5/15/2025 11:01 AM EDT -----  Please call the patient and let her know that allergy panel shows she is allergic to dust mites, cockroaches, elm trees, and ragweed. Recommend OTC Loratadine and Flonase prn. Let me know if she   needs a script.   ----- Message -----  From: [facility] Results In  Sent: 5/15/2025   8:39 AM EDT  To: Cecile Zapata, APRN-CNP

## 2025-05-17 LAB
A ALTERNATA IGE QN: <0.1 KU/L
A ALTERNATA IGE RAST: 0
A FUMIGATUS IGE QN: <0.1 KU/L
A FUMIGATUS IGE RAST: 0
A1AT PHENOTYP SERPL-IMP: NORMAL
BERMUDA GRASS IGE QN: <0.1 KU/L
BERMUDA GRASS IGE RAST: 0
BOXELDER IGE QN: <0.1 KU/L
BOXELDER IGE RAST: 0
C HERBARUM IGE QN: <0.1 KU/L
C HERBARUM IGE RAST: 0
CALIF WALNUT POLN IGE QN: 0.11 KU/L
CALIF WALNUT POLN IGE RAST: ABNORMAL
CAT DANDER IGE QN: <0.1 KU/L
CAT DANDER IGE RAST: 0
CMN PIGWEED IGE QN: <0.1 KU/L
CMN PIGWEED IGE RAST: 0
COMMON RAGWEED IGE QN: 0.75 KU/L
COMMON RAGWEED IGE RAST: 2
COTTONWOOD IGE QN: <0.1 KU/L
COTTONWOOD IGE RAST: 0
D FARINAE IGE QN: <0.1 KU/L
D FARINAE IGE RAST: 0
D PTERONYSS IGE QN: 0.12 KU/L
D PTERONYSS IGE RAST: ABNORMAL
DOG DANDER IGE QN: <0.1 KU/L
DOG DANDER IGE RAST: 0
IGE SERPL-ACNC: 217 KU/L
LONDON PLANE IGE QN: <0.1 KU/L
LONDON PLANE IGE RAST: 0
MOUSE URINE PROT IGE QN: <0.1 KU/L
MOUSE URINE PROT IGE RAST: 0
MT JUNIPER IGE QN: <0.1 KU/L
MT JUNIPER IGE RAST: 0
P NOTATUM IGE QN: <0.1 KU/L
P NOTATUM IGE RAST: 0
PECAN/HICK TREE IGE QN: <0.1 KU/L
PECAN/HICK TREE IGE RAST: 0
REF LAB TEST REF RANGE: NORMAL
ROACH IGE QN: 0.14 KU/L
ROACH IGE RAST: ABNORMAL
SALTWORT IGE QN: <0.1 KU/L
SALTWORT IGE RAST: 0
SHEEP SORREL IGE QN: <0.1 KU/L
SHEEP SORREL IGE RAST: 0
SILVER BIRCH IGE QN: <0.1 KU/L
SILVER BIRCH IGE RAST: 0
TIMOTHY IGE QN: <0.1 KU/L
TIMOTHY IGE RAST: 0
WHITE ASH IGE QN: <0.1 KU/L
WHITE ASH IGE RAST: 0
WHITE ELM IGE QN: 0.1 KU/L
WHITE ELM IGE RAST: ABNORMAL
WHITE MULBERRY IGE QN: <0.1 KU/L
WHITE MULBERRY IGE RAST: 0
WHITE OAK IGE QN: <0.1 KU/L
WHITE OAK IGE RAST: 0

## 2025-05-30 ENCOUNTER — APPOINTMENT (OUTPATIENT)
Dept: PULMONOLOGY | Facility: HOSPITAL | Age: 65
End: 2025-05-30
Payer: MEDICARE

## 2025-05-30 VITALS
OXYGEN SATURATION: 96 % | HEART RATE: 72 BPM | SYSTOLIC BLOOD PRESSURE: 148 MMHG | RESPIRATION RATE: 16 BRPM | HEIGHT: 61 IN | DIASTOLIC BLOOD PRESSURE: 85 MMHG | WEIGHT: 186 LBS | BODY MASS INDEX: 35.12 KG/M2

## 2025-05-30 DIAGNOSIS — F17.210 CIGARETTE SMOKER: ICD-10-CM

## 2025-05-30 DIAGNOSIS — J30.9 ALLERGIC RHINITIS, UNSPECIFIED SEASONALITY, UNSPECIFIED TRIGGER: ICD-10-CM

## 2025-05-30 DIAGNOSIS — J44.9 CHRONIC OBSTRUCTIVE PULMONARY DISEASE, UNSPECIFIED COPD TYPE (MULTI): Primary | ICD-10-CM

## 2025-05-30 PROCEDURE — 3008F BODY MASS INDEX DOCD: CPT | Performed by: NURSE PRACTITIONER

## 2025-05-30 PROCEDURE — 99214 OFFICE O/P EST MOD 30 MIN: CPT | Performed by: NURSE PRACTITIONER

## 2025-05-30 PROCEDURE — 4004F PT TOBACCO SCREEN RCVD TLK: CPT | Performed by: NURSE PRACTITIONER

## 2025-05-30 RX ORDER — LORATADINE 10 MG/1
10 TABLET ORAL DAILY
Qty: 30 TABLET | Refills: 11 | Status: SHIPPED | OUTPATIENT
Start: 2025-05-30

## 2025-05-30 ASSESSMENT — ENCOUNTER SYMPTOMS
UNEXPECTED WEIGHT CHANGE: 0
SHORTNESS OF BREATH: 1
WHEEZING: 1
FEVER: 0
RHINORRHEA: 0
COUGH: 1
CHILLS: 0
FATIGUE: 0

## 2025-05-30 NOTE — PROGRESS NOTES
Subjective   Patient ID: Shruthi Merrill is a 64 y.o. female who presents for follow up for COPD.     HPI: Patient has PMH of COPD, nicotine dependence, CAD, AV block s/p pacer, HLD, and RICH. She was referred for COPD management. She states that she has shortness of breath on exertion and a daily productive cough mostly in the morning. She has never been on any inhalers in the past. LDCT with emphysema. She does get sinus drainage all throughout the year. She is not currently taking anything for this. She will hear herself wheezing on occasion. She is going to establish with new PCP through . She is a current smoker at 1/2 ppd and has smoked x 50 years. She has RICH and no longer wears CPAP but uses a dental appliance. She denies any history of asthma, one of her sisters have asthma. She denies prior occupational exposures.     Today she is here for follow up. She has had improvement in symptoms since starting on Anoro. She has not had to use albuterol. She has had increased sinus drainage despite Azelastine nasal spry. She is smoking at 1/2 ppd. She has no other concerns.     Review of Systems   Constitutional:  Negative for chills, fatigue, fever and unexpected weight change.   HENT:  Positive for postnasal drip. Negative for congestion and rhinorrhea.    Respiratory:  Positive for cough (denies hemoptysis.), shortness of breath and wheezing.    Cardiovascular:  Negative for chest pain and leg swelling.   All other systems reviewed and are negative.      Objective   Physical Exam  Vitals reviewed.   Constitutional:       Appearance: Normal appearance.   HENT:      Head: Normocephalic.   Cardiovascular:      Rate and Rhythm: Normal rate and regular rhythm.   Pulmonary:      Effort: Pulmonary effort is normal.      Breath sounds: Decreased breath sounds present.   Skin:     General: Skin is warm and dry.   Neurological:      Mental Status: She is alert.         Assessment/Plan   Chronic bronchitis    Nicotine dependence   Allergic rhinitis   RICH    Plan:    -PFTs done March 2025 and were normal.  -Continue Anoro one puff once a day. Pharmacy referral placed.  -Continue albuterol prn.   -AAT normal.  -She is a current smoker at 1/2 ppd and has smoked x 50 years.   -LDCT done December 2024 with a stable 3 mm nodule and emphysematous changes. She will qualify for annual LDCT December 2025, order placed today.  -,  RAST + dust mites, cockroaches, elm trees, and ragweed.   -Continue Azelastine nasal spray BID.  -Add Loratadine.   -She has RICH and states she no longer needs CPAP but wears dental appliance. She states symptoms are controlled.    Overall we discussed testing results today. I will continue current regimen and add on Loratadine. I will get LDCT in December and see her back in one year. I instructed patient to call sooner if needed.

## 2025-05-30 NOTE — PATIENT INSTRUCTIONS
Continue Anoro one puff once a day, everyday.   Continue on albuterol as needed for shortness of breath.   Continue Azelastine nasal spray 1-2 times per day for sinus drainage.   Start on Loratadine one tablet once a day, everyday for now for sinus drainage.   Please get CT scan of your chest in December.   Call with any questions or concerns.  Follow up with me in one year.

## 2025-06-02 PROCEDURE — RXMED WILLOW AMBULATORY MEDICATION CHARGE

## 2025-06-04 ENCOUNTER — PHARMACY VISIT (OUTPATIENT)
Dept: PHARMACY | Facility: CLINIC | Age: 65
End: 2025-06-04
Payer: MEDICARE

## 2025-06-07 PROCEDURE — RXMED WILLOW AMBULATORY MEDICATION CHARGE

## 2025-06-12 ENCOUNTER — PHARMACY VISIT (OUTPATIENT)
Dept: PHARMACY | Facility: CLINIC | Age: 65
End: 2025-06-12
Payer: MEDICARE

## 2025-06-27 PROCEDURE — RXMED WILLOW AMBULATORY MEDICATION CHARGE

## 2025-06-30 ENCOUNTER — PHARMACY VISIT (OUTPATIENT)
Dept: PHARMACY | Facility: CLINIC | Age: 65
End: 2025-06-30
Payer: MEDICARE

## 2025-07-07 PROCEDURE — RXMED WILLOW AMBULATORY MEDICATION CHARGE

## 2025-07-09 ENCOUNTER — PHARMACY VISIT (OUTPATIENT)
Dept: PHARMACY | Facility: CLINIC | Age: 65
End: 2025-07-09
Payer: MEDICARE

## 2025-07-22 PROCEDURE — RXMED WILLOW AMBULATORY MEDICATION CHARGE

## 2025-07-24 ENCOUNTER — PHARMACY VISIT (OUTPATIENT)
Dept: PHARMACY | Facility: CLINIC | Age: 65
End: 2025-07-24
Payer: MEDICARE

## 2025-08-12 PROCEDURE — RXMED WILLOW AMBULATORY MEDICATION CHARGE

## 2025-08-14 ENCOUNTER — PHARMACY VISIT (OUTPATIENT)
Dept: PHARMACY | Facility: CLINIC | Age: 65
End: 2025-08-14
Payer: MEDICARE

## 2026-05-29 ENCOUNTER — APPOINTMENT (OUTPATIENT)
Dept: PULMONOLOGY | Facility: HOSPITAL | Age: 66
End: 2026-05-29
Payer: MEDICARE